# Patient Record
Sex: FEMALE | Race: OTHER | NOT HISPANIC OR LATINO
[De-identification: names, ages, dates, MRNs, and addresses within clinical notes are randomized per-mention and may not be internally consistent; named-entity substitution may affect disease eponyms.]

---

## 2018-07-20 ENCOUNTER — TRANSCRIPTION ENCOUNTER (OUTPATIENT)
Age: 58
End: 2018-07-20

## 2018-11-18 ENCOUNTER — TRANSCRIPTION ENCOUNTER (OUTPATIENT)
Age: 58
End: 2018-11-18

## 2019-02-06 ENCOUNTER — TRANSCRIPTION ENCOUNTER (OUTPATIENT)
Age: 59
End: 2019-02-06

## 2019-02-13 ENCOUNTER — TRANSCRIPTION ENCOUNTER (OUTPATIENT)
Age: 59
End: 2019-02-13

## 2021-08-22 ENCOUNTER — EMERGENCY (EMERGENCY)
Facility: HOSPITAL | Age: 61
LOS: 1 days | Discharge: HOME | End: 2021-08-22
Attending: EMERGENCY MEDICINE
Payer: COMMERCIAL

## 2021-08-22 VITALS
DIASTOLIC BLOOD PRESSURE: 89 MMHG | OXYGEN SATURATION: 97 % | SYSTOLIC BLOOD PRESSURE: 147 MMHG | HEART RATE: 60 BPM | RESPIRATION RATE: 20 BRPM | TEMPERATURE: 99 F

## 2021-08-22 VITALS
HEIGHT: 61 IN | OXYGEN SATURATION: 99 % | HEART RATE: 62 BPM | TEMPERATURE: 99 F | WEIGHT: 149.91 LBS | SYSTOLIC BLOOD PRESSURE: 162 MMHG | RESPIRATION RATE: 18 BRPM | DIASTOLIC BLOOD PRESSURE: 92 MMHG

## 2021-08-22 PROCEDURE — 90471 IMMUNIZATION ADMIN: CPT

## 2021-08-22 PROCEDURE — 12001 RPR S/N/AX/GEN/TRNK 2.5CM/<: CPT

## 2021-08-22 PROCEDURE — 99283 EMERGENCY DEPT VISIT LOW MDM: CPT | Mod: 25

## 2021-08-22 PROCEDURE — 90715 TDAP VACCINE 7 YRS/> IM: CPT

## 2021-08-22 RX ORDER — TETANUS TOXOID, REDUCED DIPHTHERIA TOXOID AND ACELLULAR PERTUSSIS VACCINE, ADSORBED 5; 2.5; 8; 8; 2.5 [IU]/.5ML; [IU]/.5ML; UG/.5ML; UG/.5ML; UG/.5ML
0.5 SUSPENSION INTRAMUSCULAR ONCE
Refills: 0 | Status: COMPLETED | OUTPATIENT
Start: 2021-08-22 | End: 2021-08-22

## 2021-08-22 RX ADMIN — TETANUS TOXOID, REDUCED DIPHTHERIA TOXOID AND ACELLULAR PERTUSSIS VACCINE, ADSORBED 0.5 MILLILITER(S): 5; 2.5; 8; 8; 2.5 SUSPENSION INTRAMUSCULAR at 20:02

## 2021-08-22 NOTE — ED PROVIDER NOTE - IV ALTEPLASE DOOR HIDDEN
I mailed out 1st overdue labs letter to patient.      CDIFFICILE TOXIGENIC PCR (OPT)   GIARDIA + CRYPTO ANTIGEN, STOOL   HEPATITIS A B + C PROFILE   STOOL CULTURE W/SHIGATOXIN
show

## 2021-08-22 NOTE — ED PROVIDER NOTE - PROGRESS NOTE DETAILS
[facility] PGY3 - Hemostasis achieved after 5 staples.  Monitored for an hour after, no re-bleeding.  Pressure dressing applied.  Will dc w/ return precautions and instructions for f/u to remove staples.  All other questions and concerns have been addressed.

## 2021-08-22 NOTE — ED ADULT NURSE NOTE - OBJECTIVE STATEMENT
Pt is a 60 y/o female s/p mechanical fall with laceration to back of head. Pt states she had 1 alcoholic drink and was dancing causing fall. Denies LOC or blood thinner use. Slight active bleeding present on arrival. C/o slight localized pain. Denies CP, SOB, N/V/D, numbness, tingling, cough, fever, chills, dizziness, weakness. A&Ox3. PERRL 4mm. Breathing unlabored and spontaneous. Abdomen soft, nontender, nondistended. Full ROM and strength of extremities. Safety and comfort measures provided, call bell provided to pt and bed in lowest position.

## 2021-08-22 NOTE — ED PROVIDER NOTE - CARE PLAN
Principal Discharge DX:	Scalp laceration   1 Principal Discharge DX:	Scalp laceration  Secondary Diagnosis:	Head injury

## 2021-08-22 NOTE — ED ADULT NURSE NOTE - CAS TRG GENERAL NORM CIRC DET
Chief Complaint   Patient presents with     RECHECK     f/u wait list     Blood pressure 128/71, pulse 68, temperature 98.2  F (36.8  C), weight 84.7 kg (186 lb 11.2 oz), SpO2 97 %.    Anna Marie Bernal, CMA        Strong peripheral pulses

## 2021-08-22 NOTE — ED PROVIDER NOTE - ATTENDING CONTRIBUTION TO CARE
head inj. w bleeding  2 cm lac to post scalp w active oozing blood. no ttp to skull. no stepoff. tm nl. no spinal ttp. from arms / legs. steady gait  repair wound. metabolize etoh. no concerning findings to suggest need to ct.

## 2021-08-22 NOTE — ED ADULT NURSE NOTE - NSIMPLEMENTINTERV_GEN_ALL_ED
Implemented All Fall Risk Interventions:  [place] to call system. Call bell, personal items and telephone within reach. Instruct patient to call for assistance. Room bathroom lighting operational. Non-slip footwear when patient is off stretcher. Physically safe environment: no spills, clutter or unnecessary equipment. Stretcher in lowest position, wheels locked, appropriate side rails in place. Provide visual cue, wrist band, yellow gown, etc. Monitor gait and stability. Monitor for mental status changes and reorient to person, place, and time. Review medications for side effects contributing to fall risk. Reinforce activity limits and safety measures with patient and family.

## 2021-08-22 NOTE — ED PROVIDER NOTE - CLINICAL SUMMARY MEDICAL DECISION MAKING FREE TEXT BOX
61F p/w occipital scalp laceration w/ active bleeding that is controlled after 5 staples.  Will administer tetanus vaccine.  No indication for CTh per [facility] head CT.  Will monitor to ensure no re-bleeding, especially given pt. is exhibiting signs of mild etoh intoxication.  Will reassess and dispo pending results.

## 2021-08-22 NOTE — ED PROVIDER NOTE - OBJECTIVE STATEMENT
61F p/w posterior scalp bleeding s/p fall and head trauma.  Pt. drank an espresso martini and proceeded to dance at her nephew's wedding.  Fell down backwards and hit the occipital scalp.  Denies LOC, AC use, amnestic episodes, n/v.  Denies any CP, SOB, abd pain, pain in extremities.

## 2021-08-22 NOTE — ED PROVIDER NOTE - NS ED ROS FT
General: denies fever, chills.  HENT: +head trauma.  Eyes: denies visual changes, blurred vision.  Neck: denies neck pain, neck swelling.  CV: denies chest pain, palpitations.  Resp: denies difficulty breathing, cough.  Abdominal: denies nausea, vomiting, diarrhea, abdominal pain, blood in stool, dark stool.  MSK: denies muscle aches, bony pain, leg pain, leg swelling.  Neuro: denies headaches, numbness, tingling, dizziness, lightheadedness.  Skin: denies rashes, cuts, bruises.  Hematologic: denies unexplained bruises.

## 2021-08-22 NOTE — ED PROVIDER NOTE - NSFOLLOWUPINSTRUCTIONS_ED_ALL_ED_FT
You were seen for head injury.    We have repaired this in the ED with 5 staples.    Please return in 10 DAYS for removal of the staples.    You can take tylenol/motrin as needed for pain.    If you have any concerning symptoms, seek immediate medical attention.    Laceration    A laceration is a cut that goes through all of the layers of the skin and into the tissue that is right under the skin. Some lacerations heal on their own. Others need to be closed with skin adhesive strips, skin glue, stitches (sutures), or staples. Proper laceration care minimizes the risk of infection and helps the laceration to heal better.  If non-absorbable stitches or staples have been placed, they must be taken out within the time frame instructed by your healthcare provider.    SEEK IMMEDIATE MEDICAL CARE IF YOU HAVE ANY OF THE FOLLOWING SYMPTOMS: swelling around the wound, worsening pain, drainage from the wound, red streaking going away from your wound, inability to move finger or toe near the laceration, or discoloration of skin near the laceration.

## 2021-08-22 NOTE — ED PROVIDER NOTE - PATIENT PORTAL LINK FT
You can access the FollowMyHealth Patient Portal offered by [facility] by registering at the following website: [email or web address]. By joining [facility]’s FollowMyHealth portal, you will also be able to view your health information using other applications (apps) compatible with our system.

## 2021-08-31 ENCOUNTER — TRANSCRIPTION ENCOUNTER (OUTPATIENT)
Age: 61
End: 2021-08-31

## 2021-09-02 NOTE — ED PROVIDER NOTE - PHYSICAL EXAMINATION
none GENERAL: Patient awake alert NAD.  HEENT: Actively bleeding wound in posterior scalp, linear 2cm laceration identified after extensive cleaning. No casarez sign/raccoon eyes, no signs of calvarial fx.  LUNGS: CTAB, no wheezes or crackles.  CARDIAC: RRR, no m/r/g.  ABDOMEN: Soft, NT, ND, No rebound, guarding.  EXT: No edema. FROM of all four extremities.  MSK: No spinal tenderness, no pain with movement, no deformities.  NEURO: Moving all extremities.  SKIN: Warm and dry. No rash.  PSYCH: Normal affect.

## 2023-01-05 ENCOUNTER — NON-APPOINTMENT (OUTPATIENT)
Age: 63
End: 2023-01-05

## 2023-02-06 ENCOUNTER — NON-APPOINTMENT (OUTPATIENT)
Age: 63
End: 2023-02-06

## 2023-02-09 ENCOUNTER — NON-APPOINTMENT (OUTPATIENT)
Age: 63
End: 2023-02-09

## 2024-04-03 NOTE — ED ADULT NURSE NOTE - NSFALLRSKASSESSTYPE_ED_ALL_ED
Addended by: MARIA DE JESUS PAPPAS on: 4/3/2024 01:28 PM     Modules accepted: Orders     Initial (On Arrival)